# Patient Record
Sex: FEMALE
[De-identification: names, ages, dates, MRNs, and addresses within clinical notes are randomized per-mention and may not be internally consistent; named-entity substitution may affect disease eponyms.]

---

## 2018-07-11 ENCOUNTER — HOSPITAL ENCOUNTER (EMERGENCY)
Dept: HOSPITAL 14 - H.ER | Age: 33
Discharge: HOME | End: 2018-07-11
Payer: MEDICAID

## 2018-07-11 VITALS — RESPIRATION RATE: 17 BRPM

## 2018-07-11 VITALS — HEART RATE: 78 BPM | SYSTOLIC BLOOD PRESSURE: 120 MMHG | TEMPERATURE: 98.2 F | DIASTOLIC BLOOD PRESSURE: 70 MMHG

## 2018-07-11 VITALS — BODY MASS INDEX: 20.5 KG/M2

## 2018-07-11 DIAGNOSIS — N39.0: Primary | ICD-10-CM

## 2018-07-11 DIAGNOSIS — R10.9: ICD-10-CM

## 2018-07-11 DIAGNOSIS — K59.00: ICD-10-CM

## 2018-07-11 DIAGNOSIS — Z88.0: ICD-10-CM

## 2018-07-11 DIAGNOSIS — Z97.5: ICD-10-CM

## 2018-07-11 LAB
ALBUMIN SERPL-MCNC: 4.2 [, G/DL] (ref 3.5–5)
ALBUMIN/GLOB SERPL: 1.4 [,] (ref 1–2.1)
ALT SERPL-CCNC: 14 [, U/L] (ref 9–52)
AST SERPL-CCNC: 22 [, U/L] (ref 14–36)
BASOPHILS # BLD AUTO: 0 [, K/UL] (ref 0–0.2)
BASOPHILS NFR BLD: 0.4 [, %] (ref 0–2)
BILIRUB UR-MCNC: NEGATIVE [,]
BUN SERPL-MCNC: 11 [, MG/DL] (ref 7–17)
CALCIUM SERPL-MCNC: 8.9 [, MG/DL] (ref 8.4–10.2)
COLOR UR: YELLOW [,]
EOSINOPHIL # BLD AUTO: 0 [, K/UL] (ref 0–0.7)
EOSINOPHIL NFR BLD: 0.3 [, %] (ref 0–4)
ERYTHROCYTE [DISTWIDTH] IN BLOOD BY AUTOMATED COUNT: 12.5 [, %] (ref 11.5–14.5)
GFR NON-AFRICAN AMERICAN: > 60 [,]
GLUCOSE UR STRIP-MCNC: (no result) [, MG/DL]
HGB BLD-MCNC: 12.8 [, G/DL] (ref 12–16)
LEUKOCYTE ESTERASE UR-ACNC: (no result) [, LEU/UL]
LYMPHOCYTES # BLD AUTO: 0.6 [, K/UL] (ref 1–4.3)
LYMPHOCYTES NFR BLD AUTO: 19.4 [, %] (ref 20–40)
MCH RBC QN AUTO: 30.6 [, PG] (ref 27–31)
MCHC RBC AUTO-ENTMCNC: 34.5 [, G/DL] (ref 33–37)
MCV RBC AUTO: 88.7 [, FL] (ref 81–99)
MONOCYTES # BLD: 0.5 [, K/UL] (ref 0–0.8)
MONOCYTES NFR BLD: 16.6 [, %] (ref 0–10)
NEUTROPHILS # BLD: 1.9 [, K/UL] (ref 1.8–7)
NEUTROPHILS NFR BLD AUTO: 63.3 [, %] (ref 50–75)
NRBC BLD AUTO-RTO: 0.5 [, %] (ref 0–0)
PH UR STRIP: 5 [,] (ref 5–8)
PLATELET # BLD: 160 [, K/UL] (ref 130–400)
PMV BLD AUTO: 9.1 [, FL] (ref 7.2–11.7)
PROT UR STRIP-MCNC: 30 [, MG/DL]
RBC # BLD AUTO: 4.18 [, MIL/UL] (ref 3.8–5.2)
RBC # UR STRIP: (no result) [,]
SP GR UR STRIP: 1.03 [,] (ref 1–1.03)
SQUAMOUS EPITHIAL: 7 [, /HPF] (ref 0–5)
URINE CLARITY: (no result) [,]
UROBILINOGEN UR-MCNC: (no result) [, MG/DL] (ref 0.2–1)
WBC # BLD AUTO: 2.9 [, K/UL] (ref 4.8–10.8)

## 2018-07-11 PROCEDURE — 99283 EMERGENCY DEPT VISIT LOW MDM: CPT

## 2018-07-11 PROCEDURE — 81003 URINALYSIS AUTO W/O SCOPE: CPT

## 2018-07-11 PROCEDURE — 85025 COMPLETE CBC W/AUTO DIFF WBC: CPT

## 2018-07-11 PROCEDURE — 76857 US EXAM PELVIC LIMITED: CPT

## 2018-07-11 PROCEDURE — 80053 COMPREHEN METABOLIC PANEL: CPT

## 2018-07-11 PROCEDURE — 96360 HYDRATION IV INFUSION INIT: CPT

## 2018-07-11 PROCEDURE — 74177 CT ABD & PELVIS W/CONTRAST: CPT

## 2018-07-11 PROCEDURE — 81025 URINE PREGNANCY TEST: CPT

## 2018-07-11 NOTE — CT
Date of service: 



07/11/2018



PROCEDURE:  CT Abdomen and Pelvis with Oral contrast.



HISTORY:

Bilateral lower quadrant pain, low grade fever



COMPARISON:

None.



TECHNIQUE:

Contiguous helical/transaxial images of the abdomen and pelvis. . 

Coronal and Sagittal reformats generated. 



Radiation dose:



Total exam DLP =  mGy-cm.



This CT exam was performed using one or more of the following dose 

reduction techniques: Automated exposure control, adjustment of the 

mA and/or kV according to patient size, and/or use of iterative 

reconstruction technique.



Contrast dose: 90 cc of Omnipaque 300 



Radiation dose:



Total exam DLP = 286.63 mGy-cm.



FINDINGS:



LOWER THORAX:

Heart size normal. .  Tiny hiatal hernia.



Minor passive/ dependent type atelectasis both posterior lower lung 

zones. No focal consolidation effusion or basilar pneumothorax. 



Lung bases are otherwise clear.



LIVER:

Liver exhibits relatively normal size. Very mild diffuse fatty 

hepatic infiltration.



Small ill-defined approximately 8.4 mm elliptical shaped 

low-attenuation focus right lobe liver too small to characterize 

though Hounsfield units in the mid 60s suggest structure other than 

cyst. Findings may represent hemangioma. Follow-up non emergent 

ultrasound could be performed to further characterize.  Alternately, 

triple phase CT scan liver on could be obtained 



GALLBLADDER AND BILE DUCTS:

Gallbladder is incompletely distended which presumably accounts for 

slight thick-walled appearance.  No obvious intraluminal gallbladder 

calculi. . 



PANCREAS:

Unremarkable. No mass. No ductal dilatation.



SPLEEN:

Spleen exhibits normal size and attenuation pattern without mass 

collection or calcification. 



ADRENALS:

No adrenal lesions. 



KIDNEYS AND URETERS:

Kidneys demonstrate symmetric nephrograms.  No evidence of 

nephrolithiasis or hydronephrosis.



BLADDER:

Urinary bladder is incompletely distended which in part accounts for 

thick-walled appearance.  Correlation with urinalysis recommended to 

exclude cystitis.



REPRODUCTIVE:

In situ Copper-T IUD 



APPENDIX:

Normal-appearing appendix best seen on axial image number [id number]. 

No periappendiceal inflammatory changes. Bowel



Stomach at is partially distended. 



Visualized loops of small bowel exhibit normal contour and caliber. 

No evidence of acute mechanical small bowel obstruction. 



Large amount of stool seen throughout the entire colon consistent 

with fecal retention/constipation. 



PERITONEUM:

Unremarkable. No fluid collection. No free air.



LYMPH NODES:

Unremarkable. No enlarged lymph nodes. 



VASCULATURE:

Unremarkable. No aortic aneurysm. 



BONES:

No fracture or destructive lesion. 



OTHER FINDINGS:

None. 



IMPRESSION:

Findings consistent with moderate to fairly significant constipation. 



Mild fatty hepatic infiltration. 



Small low-attenuation focus right lobe liver too small to 

characterize though this could represent small hemangioma. Follow-up 

non emergent ultrasound could be performed to further characterize.  

Alternately, triple phase CT scan liver on could be obtained 



In situ Copper-T IUD

## 2018-07-11 NOTE — US
Date of service: 



07/11/2018



HISTORY:

bilateral pelvic pain R>>L; has Mirena in place



COMPARISON:

None available.



TECHNIQUE:

Transabdominal



FINDINGS:



UTERUS:

Measures 8.6 x 5.7 x 4.6 cm. Normal in size and appearance. No 

fibroid or other mass lesion seen.



ENDOMETRIUM:

Measures 7 mm in diameter. IUD device in uterine cavity. Possible 

concomitant the dystrophic type calcifications lower uterine segment. 

 No mass seen 



CERVIX:

No cervical abnormality identified.



RIGHT OVARY:

Measures 3.4 x 3.2 x 1 point cm. No solid mass. Normal flow. Normal 

appearing follicular cysts



LEFT OVARY:

Measures 3.3 x 3.1 x 2.0 cm. No solid mass. Normal flow. Normal 

appearing follicular cyst



FREE FLUID:

No significant free fluid noted.



OTHER FINDINGS:

None. 



IMPRESSION:

IUD in place. No adnexal masses. 



Other findings -as above.

## 2018-07-11 NOTE — ED PDOC
- Laboratory Results


Result Diagrams: 


 18 10:10





 18 10:10





- ECG


O2 Sat by Pulse Oximetry: 98 (RA)





Medical Decision Making


Medical Decision Makin:00


Patient endorsed to me from Dr. Viramontes.  Pending CT.























--------------------------------------------------------------------------------

-----------------


Scribe Attestation:


Documented by Samuel Olmedo acting as a scribe for Corazon Fernandez MD.





Provider Scribe Attestation:


All medical record entries made by the Scribe were at my direction and 

personally dictated by me. I have reviewed the chart and agree that the record 

accurately reflects my personal performance of the history, physical exam, 

medical decision making, and the department course for this patient. I have 

also personally directed, reviewed, and agree with the discharge instructions 

and disposition.





Disposition





- Clinical Impression


Clinical Impression: 


 UTI (urinary tract infection), Constipation, Abdominal pain








- Disposition


Referrals: 


[facility] [Outside]


Kirstin Peña MD [Family Provider] - 


Condition: STABLE


Prescriptions: 


Nitrofurantoin Macrocrystals [Macrobid] 100 mg PO BID #13 cap


Polyethylene Glycol 3350 [Miralax] 1 tbs PO DAILY PRN #1 bottle


 PRN Reason: Constipation


Instructions:  Urinary Tract Infections in Adults, Constipation in Adults, 

Acute Abdomen (Belly Pain)


Forms:  [facility] (Albanian)


Print Language: Bengali

## 2018-07-11 NOTE — ED PDOC
HPI: General Adult


Time Seen by Provider: 07/11/18 09:13


Chief Complaint (Nursing): Flu-like Symptoms


Chief Complaint (Provider): Fever, Headache, Body Aches


History Per: Patient


History/Exam Limitations: no limitations


Onset/Duration Of Symptoms: Days (x2)


Current Symptoms Are (Timing): Still Present


Additional Complaint(s): 


34 y/o female with no significant PMHx presenting for evaluation of fever, 

headache, and body aches x2 days. Patient states she started feeling sick 

yesterday afternoon after coming home from work. She reports she had a 101 

fever at home associated with headaches, body aches, and some back pain. She 

states she last took Motrin and Tylenol at 10pm yesterday. She denies any sore 

throat, rhinorrhea, cough, dysuria, urinary frequency, nausea, vomiting, 

diarrhea, chest pain, or shortness of breath. Patient reports she has an IUD in 

place. 





PMD: Dr. Kirstin Peña





Past Medical History


Reviewed: Historical Data, Nursing Documentation, Vital Signs


Vital Signs: 


 Last Vital Signs











Temp  99.8 F H  07/11/18 09:19


 


Pulse  94 H  07/11/18 09:06


 


Resp  17   07/11/18 09:06


 


BP  121/79   07/11/18 09:06


 


Pulse Ox  98   07/11/18 09:55














- Medical History


PMH: No Chronic Diseases





- Surgical History


Other surgeries: Lumpectomy (breast tumor)





- Family History


Family History: States: Unknown Family Hx





- Social History


Current smoker - smoking cessation education provided: No


Alcohol: None


Drugs: Denies





- Immunization History


Hx Tetanus Toxoid Vaccination: No


Hx Influenza Vaccination: No


Hx Pneumococcal Vaccination: No





- Allergies


Allergies/Adverse Reactions: 


 Allergies











Allergy/AdvReac Type Severity Reaction Status Date / Time


 


Penicillins Allergy  RASH Verified 07/11/18 09:51














Review of Systems


ROS Statement: Except As Marked, All Systems Reviewed And Found Negative


Constitutional: Positive for: Fever, Malaise, Other (body aches)


ENT: Negative for: Nose Discharge, Throat Pain


Cardiovascular: Negative for: Chest Pain


Respiratory: Negative for: Cough, Shortness of Breath


Gastrointestinal: Negative for: Nausea, Vomiting, Diarrhea


Genitourinary Female: Negative for: Dysuria, Frequency


Musculoskeletal: Positive for: Back Pain


Neurological: Positive for: Headache





Physical Exam





- Reviewed


Nursing Documentation Reviewed: Yes


Vital Signs Reviewed: Yes





- Physical Exam


Appears: Positive for: Non-toxic, No Acute Distress


Head Exam: Positive for: ATRAUMATIC, NORMAL INSPECTION, NORMOCEPHALIC


Skin: Positive for: Normal Color, Warm, Dry.  Negative for: Rash


Eye Exam: Positive for: EOMI, Normal appearance, PERRL


ENT: Positive for: Normal ENT Inspection, Pharynx Is (clear), TM Is/Are (normal)


Neck: Positive for: Normal, Painless ROM, Supple


Cardiovascular/Chest: Positive for: Regular Rate, Rhythm.  Negative for: Murmur


Respiratory: Positive for: Normal Breath Sounds.  Negative for: Respiratory 

Distress


Gastrointestinal/Abdominal: Positive for: Soft, Tenderness (tenderness on 

percussion of LLQ, RLQ, and LUQ; palpable tenderness in the LLQ and RLQ with 

right more than left)


Back: Positive for: L CVA Tenderness (mild)


Extremity: Positive for: Normal ROM.  Negative for: Pedal Edema, Deformity


Neurologic/Psych: Positive for: Alert, Oriented (x3).  Negative for: Motor/

Sensory Deficits





- ECG


O2 Sat by Pulse Oximetry: 98 (RA)


Pulse Ox Interpretation: Normal





Medical Decision Making


Medical Decision Making: 


Impression: Febrile illness. R/o UTI vs pyelonephritis vs pelvic pain


Plan:


-CMP


-Urine pregnancy


-Urine dipstick


-CBC w/ differential


-1LNS


-IV insertion


-Urinalysis


-US non OB pelvic scan


-Reevaluation





--------------------------------------------------------------------------------

----------------------------------


Scribe Attestation: 


Documented by Weston Padilla, acting as a scribe for Seema Viramontes MD. 





Provider Scribe Attestation:


All medical record entries made by the Scribe were at my direction and 

personally dictated by me. I have reviewed the chart and agree that the record 

accurately reflects my personal performance of the history, physical exam, 

medical decision making, and the department course for this patient. I have 

also personally directed, reviewed, and agree with the discharge instructions 

and disposition.











Disposition





- Disposition


Forms:  [facility] (English)

## 2018-07-14 VITALS — OXYGEN SATURATION: 98 %
